# Patient Record
Sex: MALE | Race: WHITE | NOT HISPANIC OR LATINO | Employment: FULL TIME | URBAN - METROPOLITAN AREA
[De-identification: names, ages, dates, MRNs, and addresses within clinical notes are randomized per-mention and may not be internally consistent; named-entity substitution may affect disease eponyms.]

---

## 2018-04-22 ENCOUNTER — HOSPITAL ENCOUNTER (EMERGENCY)
Facility: MEDICAL CENTER | Age: 33
End: 2018-04-22

## 2018-04-22 VITALS
TEMPERATURE: 98 F | DIASTOLIC BLOOD PRESSURE: 74 MMHG | BODY MASS INDEX: 29.88 KG/M2 | OXYGEN SATURATION: 98 % | HEART RATE: 77 BPM | SYSTOLIC BLOOD PRESSURE: 132 MMHG | RESPIRATION RATE: 16 BRPM | WEIGHT: 240.3 LBS | HEIGHT: 75 IN

## 2018-04-22 LAB — EKG IMPRESSION: NORMAL

## 2018-04-22 PROCEDURE — 302449 STATCHG TRIAGE ONLY (STATISTIC)

## 2018-04-22 PROCEDURE — 93005 ELECTROCARDIOGRAM TRACING: CPT

## 2018-04-22 ASSESSMENT — PAIN SCALES - GENERAL: PAINLEVEL_OUTOF10: 2

## 2018-09-20 ENCOUNTER — OCCUPATIONAL MEDICINE (OUTPATIENT)
Dept: OCCUPATIONAL MEDICINE | Facility: CLINIC | Age: 33
End: 2018-09-20
Payer: COMMERCIAL

## 2018-09-20 ENCOUNTER — APPOINTMENT (OUTPATIENT)
Dept: RADIOLOGY | Facility: IMAGING CENTER | Age: 33
End: 2018-09-20
Attending: PREVENTIVE MEDICINE
Payer: COMMERCIAL

## 2018-09-20 VITALS
HEIGHT: 75 IN | WEIGHT: 235 LBS | HEART RATE: 90 BPM | BODY MASS INDEX: 29.22 KG/M2 | RESPIRATION RATE: 16 BRPM | SYSTOLIC BLOOD PRESSURE: 128 MMHG | DIASTOLIC BLOOD PRESSURE: 82 MMHG

## 2018-09-20 DIAGNOSIS — M54.16 LEFT LUMBAR RADICULOPATHY: ICD-10-CM

## 2018-09-20 PROCEDURE — 72100 X-RAY EXAM L-S SPINE 2/3 VWS: CPT | Mod: TC | Performed by: FAMILY MEDICINE

## 2018-09-20 PROCEDURE — 99204 OFFICE O/P NEW MOD 45 MIN: CPT | Performed by: PREVENTIVE MEDICINE

## 2018-09-20 RX ORDER — TIZANIDINE 4 MG/1
4 TABLET ORAL EVERY 6 HOURS PRN
Qty: 30 TAB | Refills: 3 | Status: SHIPPED | OUTPATIENT
Start: 2018-09-20

## 2018-09-20 RX ORDER — DICLOFENAC SODIUM 75 MG/1
75 TABLET, DELAYED RELEASE ORAL 2 TIMES DAILY
Qty: 60 TAB | Refills: 1 | Status: SHIPPED | OUTPATIENT
Start: 2018-09-20

## 2018-09-20 ASSESSMENT — PAIN SCALES - GENERAL: PAINLEVEL: 9=SEVERE PAIN

## 2018-09-20 NOTE — LETTER
"[address],   [address] [place] [address]  Phone:  [phone] - Fax:  [phone]   [facility] Progress Report and Disability Certification  Date of Service: 9/20/2018   No Show:  No  Date / Time of Next Visit: 10/18/2018 @ 10:00 AM    Claim Information   Patient Name: Josue Avitia  Claim Number:     Employer:   [employer] Date of Injury: 9/10/2018     Insurer / TPA: Misc Workers Comp  ID / SSN:     Occupation: Forklift worker  Diagnosis: The encounter diagnosis was Left lumbar radiculopathy.    Medical Information   Related to Industrial Injury? Yes    Subjective Complaints:  Date of injury 9/10/2018.  Mechanism of injury- \"grabbed object and stood up and felt sharp pain go down\" back and left leg.  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  He has been seen on urgent care for 1 visit where steroids were prescribed.  He has had very slight improvement.  He continues to complain of 9/10 unbearable, shooting, throbbing pain with radiation to the left calf.  He also feels pain on the lateral toes.  No urinary symptoms.   Objective Findings: Appearance: Well-developed, well-nourished.   Mental Status: Mood and Affect normal. Pleasant. Cooperative. Appropriate.   ENT: Oropharynx clear. Moist mucous membranes. Hearing normal.   Eyes: Pupils reactive. Conjunctiva normal. No scleral icterus.   Neck: Trachea Midline. No thyromegaly. No masses.  Cardiovascular: Normal rate. Regular rhythm. Normal heart sounds.   Chest: Effort normal. Breath sounds clear.   Skin: Skin is warm and dry. No rash.   Musculoskeletal: Back exam shows mild thoracolumbar paraspinal tenderness.  No sciatic notch tenderness.  Positive straight leg raise.  Positive crossover sign.  Flexion-extension pain.  Distal neurovascular intact.   Pre-Existing Condition(s):     Assessment:   Condition Same    Status: Additional Care Required  Permanent " Disability:No    Plan: MedicationMedication (NOT at Work)PTDiagnostics    Diagnostics: MRI    Comments:  MRI requested for definitive diagnosis due to lumbar radiculopathy  Physical therapy requested for additional pain relief and therapeutic exercise    Disability Information   Status: Released to Restricted Duty    From:  9/20/2018  Through: 10/18/2018 Restrictions are: Temporary   Physical Restrictions   Sitting:    Standing:    Stooping:  < or = to 1 hr/day Bending:  < or = to 1 hr/day   Squatting:    Walking:    Climbing:    Pushing:      Pulling:    Other:    Reaching Above Shoulder (L):   Reaching Above Shoulder (R):       Reaching Below Shoulder (L):    Reaching Below Shoulder (R):      Not to exceed Weight Limits   Carrying(hrs):   Weight Limit(lb):   Lifting(hrs):   Weight  Limit(lb): < or = to 10 pounds   Comments:      Repetitive Actions   Hands: i.e. Fine Manipulations from Grasping:     Feet: i.e. Operating Foot Controls:     Driving / Operate Machinery:     Physician Name: Neal Phipps M.D. Physician Signature: NEAL Obando M.D. e-Signature: Dr. Anurag Garcia, Medical Director   Clinic Name / Location: [address],   [address] [place] [address] Clinic Phone Number: Dept: [phone]   Appointment Time: 1:00 Pm Visit Start Time: 1:06 PM   Check-In Time:  12:59 Pm Visit Discharge Time:  2:10PM   Original-Treating Physician or Chiropractor    Page 2-Insurer/TPA    Page 3-Employer    Page 4-Employee

## 2018-09-20 NOTE — PROGRESS NOTES
"Subjective:      Josue Avitia is a 33 y.o. male who presents with Other (WC new2u DOI 9/10/18 lower back, same, rm 2)      Date of injury 9/10/2018.  Mechanism of injury- \"grabbed object and stood up and felt sharp pain go down\" back and left leg.  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  He has been seen on urgent care for 1 visit where steroids were prescribed.  He has had very slight improvement.  He continues to complain of 9/10 unbearable, shooting, throbbing pain with radiation to the left calf.  He also feels pain on the lateral toes.  No urinary symptoms.     HPI    ROS  Comprehensive medical history form reviewed. Pertinent positives and negatives included in HPI.    PFSH: reviewed in Epic    PMH:  has a past medical history of Hepatitis C.  MEDS:   Current Outpatient Prescriptions:   •  diclofenac EC (VOLTAREN) 75 MG Tablet Delayed Response, Take 1 Tab by mouth 2 times a day., Disp: 60 Tab, Rfl: 1  •  tizanidine (ZANAFLEX) 4 MG Tab, Take 1 Tab by mouth every 6 hours as needed., Disp: 30 Tab, Rfl: 3  •  MethylPREDNISolone (MEDROL DOSEPAK) 4 MG Tablet Therapy Pack, Use as directed, Disp: 21 Tab, Rfl: 0  ALLERGIES:   Allergies   Allergen Reactions   • Amoxicillin    • Keflex    • Penicillin G      SURGHX: History reviewed. No pertinent surgical history.  SOCHX:  reports that he quit smoking about 5 months ago. He has never used smokeless tobacco. He reports that he drinks alcohol. He reports that he does not use drugs.  Work Status: Works as   FH: No pertinent hereditary disorders.        Objective:     /82   Pulse 90   Resp 16   Ht 1.905 m (6' 3\")   Wt 106.6 kg (235 lb)   BMI 29.37 kg/m²      Physical Exam    Appearance: Well-developed, well-nourished.   Mental Status: Mood and Affect normal. Pleasant. Cooperative. Appropriate.   ENT: Oropharynx clear. Moist mucous membranes. Hearing normal.   Eyes: Pupils reactive. Conjunctiva normal. No " scleral icterus.   Neck: Trachea Midline. No thyromegaly. No masses.  Cardiovascular: Normal rate. Regular rhythm. Normal heart sounds.   Chest: Effort normal. Breath sounds clear.   Skin: Skin is warm and dry. No rash.   Musculoskeletal: Back exam shows mild thoracolumbar paraspinal tenderness.  No sciatic notch tenderness.  Positive straight leg raise.  Positive crossover sign.  Flexion-extension pain.  Distal neurovascular intact.       Assessment/Plan:     1. Left lumbar radiculopathy  New to occupational health from urgent care  - DX-LUMBAR SPINE-2 OR 3 VIEWS; Future  - diclofenac EC (VOLTAREN) 75 MG Tablet Delayed Response; Take 1 Tab by mouth 2 times a day.  Dispense: 60 Tab; Refill: 1  - tizanidine (ZANAFLEX) 4 MG Tab; Take 1 Tab by mouth every 6 hours as needed.  Dispense: 30 Tab; Refill: 3  - REFERRAL TO PHYSICAL THERAPY Reason for Therapy: Eval/Treat/Report- primarily for additional pain relief pending MRI  - MR-LUMBAR SPINE-W/O; Future-probable discogenic low back pain due to lumbar radiculopathy  - REFERRAL TO RADIOLOGY  Restricted activity  Recheck in 3 weeks or sooner if MRI accomplished  Anticipate physiatry referral for epidural steroids/nerve root block

## 2018-09-25 ENCOUNTER — NON-PROVIDER VISIT (OUTPATIENT)
Dept: OCCUPATIONAL MEDICINE | Facility: CLINIC | Age: 33
End: 2018-09-25

## 2018-09-25 PROCEDURE — 8911 PR MRO FEE: Performed by: INTERNAL MEDICINE

## 2018-10-18 ENCOUNTER — OCCUPATIONAL MEDICINE (OUTPATIENT)
Dept: OCCUPATIONAL MEDICINE | Facility: CLINIC | Age: 33
End: 2018-10-18
Payer: COMMERCIAL

## 2018-10-18 VITALS
SYSTOLIC BLOOD PRESSURE: 124 MMHG | WEIGHT: 238 LBS | DIASTOLIC BLOOD PRESSURE: 74 MMHG | HEIGHT: 75 IN | OXYGEN SATURATION: 95 % | TEMPERATURE: 98.4 F | HEART RATE: 84 BPM | BODY MASS INDEX: 29.59 KG/M2

## 2018-10-18 DIAGNOSIS — M54.16 LEFT LUMBAR RADICULOPATHY: ICD-10-CM

## 2018-10-18 PROCEDURE — 99213 OFFICE O/P EST LOW 20 MIN: CPT | Performed by: PREVENTIVE MEDICINE

## 2018-10-18 ASSESSMENT — ENCOUNTER SYMPTOMS
CONSTITUTIONAL NEGATIVE: 1
GASTROINTESTINAL NEGATIVE: 1

## 2018-10-18 NOTE — PROGRESS NOTES
"Subjective:      Josue Avitia is a 33 y.o. male who presents with Back Pain (WC DOI 9/10/18 Lower back pain-same RM21)      Date of injury 9/10/2018.  Mechanism of injury- \"grabbed object and stood up and felt sharp pain go down\" back and left leg.  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  He does not really indicate any improvement.  He continues to have left-sided lower back pain with radiation to the left calf.  He is attending physical therapy.  MRI was accomplished.  He is not working.     HPI    Review of Systems   Constitutional: Negative.    Gastrointestinal: Negative.      PFSH:  WORK STATUS: Not working-no light duty available  PMH:  has a past medical history of Hepatitis C.  MEDS:   Current Outpatient Prescriptions:   •  diclofenac EC (VOLTAREN) 75 MG Tablet Delayed Response, Take 1 Tab by mouth 2 times a day., Disp: 60 Tab, Rfl: 1  •  tizanidine (ZANAFLEX) 4 MG Tab, Take 1 Tab by mouth every 6 hours as needed., Disp: 30 Tab, Rfl: 3  •  MethylPREDNISolone (MEDROL DOSEPAK) 4 MG Tablet Therapy Pack, Use as directed (Patient not taking: Reported on 10/18/2018), Disp: 21 Tab, Rfl: 0       Objective:     /74   Pulse 84   Temp 36.9 °C (98.4 °F)   Ht 1.905 m (6' 3\")   Wt 108 kg (238 lb)   SpO2 95%   BMI 29.75 kg/m²      Physical Exam    Appearance: Well-developed, well-nourished.   Mental Status: Mood and Affect normal. Pleasant. Cooperative. Appropriate.   Musculoskeletal: Normal posture.  Normal gait.  Painful flexion and extension.  MRI final result pending.       Assessment/Plan:     1. Left lumbar radiculopathy  Condition unchanged  May continue physical therapy  - REFERRAL TO PHYSIATRY (PMR) for epidural steroid/nerve root block  Restricted activity  Recheck in 1 month      "

## 2018-10-18 NOTE — LETTER
"[address],   [address] [place] [address]  Phone:  [phone] - Fax:  [phone]   [facility] Progress Report and Disability Certification  Date of Service: 10/18/2018   No Show:  No  Date / Time of Next Visit: 11/29/2018 @ 8:15 AM   Claim Information   Patient Name: Josue Avitia  Claim Number:     Employer:   [employer] Date of Injury: 9/10/2018     Insurer / TPA: Misc Workers Comp  ID / SSN:     Occupation: Forklift worker  Diagnosis: The encounter diagnosis was Left lumbar radiculopathy.    Medical Information   Related to Industrial Injury? Yes    Subjective Complaints:  Date of injury 9/10/2018.  Mechanism of injury- \"grabbed object and stood up and felt sharp pain go down\" back and left leg.  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  He does not really indicate any improvement.  He continues to have left-sided lower back pain with radiation to the left calf.  He is attending physical therapy.  MRI was accomplished.  He is not working.   Objective Findings: Appearance: Well-developed, well-nourished.   Mental Status: Mood and Affect normal. Pleasant. Cooperative. Appropriate.   Musculoskeletal: Normal posture.  Normal gait.  Painful flexion and extension.  MRI final result pending.   Pre-Existing Condition(s):     Assessment:   Condition Same    Status: Additional Care Required  Permanent Disability:     Plan: Consultation    Diagnostics:      Comments:  Physiatry consultation for epidural steroid/nerve root block    Disability Information   Status: Released to Restricted Duty    From:  10/18/2018  Through: 11/29/2018 Restrictions are:     Physical Restrictions   Sitting:    Standing:    Stooping:    Bending:  < or = to 1 hr/day   Squatting:    Walking:    Climbing:    Pushing:      Pulling:    Other:    Reaching Above Shoulder (L):   Reaching Above Shoulder (R):       Reaching Below Shoulder (L):  " Reaching Below Shoulder (R):      Not to exceed Weight Limits   Carrying(hrs):   Weight Limit(lb):   Lifting(hrs):   Weight  Limit(lb): < or = to 10 pounds   Comments:      Repetitive Actions   Hands: i.e. Fine Manipulations from Grasping:     Feet: i.e. Operating Foot Controls:     Driving / Operate Machinery:     Physician Name: Neal Phipps M.D. Physician Signature: NEAL Obando M.D. e-Signature: Dr. Anurag Garcia, Medical Director   Clinic Name / Location: [address],   [address]  [place] [address] Clinic Phone Number: Dept: [phone]   Appointment Time: 10:15 Am Visit Start Time: 10:19 AM   Check-In Time:  9:55 Am Visit Discharge Time: 11:44 AM   Original-Treating Physician or Chiropractor    Page 2-Insurer/TPA    Page 3-Employer    Page 4-Employee

## 2018-10-29 ENCOUNTER — HOSPITAL ENCOUNTER (EMERGENCY)
Facility: MEDICAL CENTER | Age: 33
End: 2018-10-29
Attending: EMERGENCY MEDICINE

## 2018-10-29 ENCOUNTER — APPOINTMENT (OUTPATIENT)
Dept: RADIOLOGY | Facility: MEDICAL CENTER | Age: 33
End: 2018-10-29
Attending: EMERGENCY MEDICINE

## 2018-10-29 VITALS
OXYGEN SATURATION: 97 % | WEIGHT: 236.77 LBS | BODY MASS INDEX: 29.44 KG/M2 | DIASTOLIC BLOOD PRESSURE: 61 MMHG | SYSTOLIC BLOOD PRESSURE: 121 MMHG | HEIGHT: 75 IN | RESPIRATION RATE: 16 BRPM | HEART RATE: 68 BPM | TEMPERATURE: 98.9 F

## 2018-10-29 DIAGNOSIS — R19.7 DIARRHEA, UNSPECIFIED TYPE: ICD-10-CM

## 2018-10-29 DIAGNOSIS — R07.89 OTHER CHEST PAIN: ICD-10-CM

## 2018-10-29 DIAGNOSIS — F41.9 ANXIETY: ICD-10-CM

## 2018-10-29 DIAGNOSIS — R07.9 CHEST PAIN, UNSPECIFIED TYPE: ICD-10-CM

## 2018-10-29 LAB
ALBUMIN SERPL BCP-MCNC: 4.7 G/DL (ref 3.2–4.9)
ALBUMIN/GLOB SERPL: 1.4 G/DL
ALP SERPL-CCNC: 60 U/L (ref 30–99)
ALT SERPL-CCNC: 49 U/L (ref 2–50)
ANION GAP SERPL CALC-SCNC: 7 MMOL/L (ref 0–11.9)
AST SERPL-CCNC: 23 U/L (ref 12–45)
BASOPHILS # BLD AUTO: 0.9 % (ref 0–1.8)
BASOPHILS # BLD: 0.07 K/UL (ref 0–0.12)
BILIRUB SERPL-MCNC: 0.6 MG/DL (ref 0.1–1.5)
BUN SERPL-MCNC: 10 MG/DL (ref 8–22)
CALCIUM SERPL-MCNC: 9.8 MG/DL (ref 8.5–10.5)
CHLORIDE SERPL-SCNC: 105 MMOL/L (ref 96–112)
CO2 SERPL-SCNC: 26 MMOL/L (ref 20–33)
CREAT SERPL-MCNC: 0.83 MG/DL (ref 0.5–1.4)
EKG IMPRESSION: NORMAL
EOSINOPHIL # BLD AUTO: 0.42 K/UL (ref 0–0.51)
EOSINOPHIL NFR BLD: 5.7 % (ref 0–6.9)
ERYTHROCYTE [DISTWIDTH] IN BLOOD BY AUTOMATED COUNT: 36.4 FL (ref 35.9–50)
GLOBULIN SER CALC-MCNC: 3.4 G/DL (ref 1.9–3.5)
GLUCOSE SERPL-MCNC: 105 MG/DL (ref 65–99)
HCT VFR BLD AUTO: 47.2 % (ref 42–52)
HGB BLD-MCNC: 17.6 G/DL (ref 14–18)
IMM GRANULOCYTES # BLD AUTO: 0.04 K/UL (ref 0–0.11)
IMM GRANULOCYTES NFR BLD AUTO: 0.5 % (ref 0–0.9)
LYMPHOCYTES # BLD AUTO: 1.5 K/UL (ref 1–4.8)
LYMPHOCYTES NFR BLD: 20.2 % (ref 22–41)
MCH RBC QN AUTO: 32.5 PG (ref 27–33)
MCHC RBC AUTO-ENTMCNC: 37.3 G/DL (ref 33.7–35.3)
MCV RBC AUTO: 87.2 FL (ref 81.4–97.8)
MONOCYTES # BLD AUTO: 0.61 K/UL (ref 0–0.85)
MONOCYTES NFR BLD AUTO: 8.2 % (ref 0–13.4)
NEUTROPHILS # BLD AUTO: 4.77 K/UL (ref 1.82–7.42)
NEUTROPHILS NFR BLD: 64.5 % (ref 44–72)
NRBC # BLD AUTO: 0 K/UL
NRBC BLD-RTO: 0 /100 WBC
PLATELET # BLD AUTO: 341 K/UL (ref 164–446)
PMV BLD AUTO: 9.5 FL (ref 9–12.9)
POTASSIUM SERPL-SCNC: 3.8 MMOL/L (ref 3.6–5.5)
PROT SERPL-MCNC: 8.1 G/DL (ref 6–8.2)
RBC # BLD AUTO: 5.41 M/UL (ref 4.7–6.1)
SODIUM SERPL-SCNC: 138 MMOL/L (ref 135–145)
TROPONIN I SERPL-MCNC: <0.01 NG/ML (ref 0–0.04)
WBC # BLD AUTO: 7.4 K/UL (ref 4.8–10.8)

## 2018-10-29 PROCEDURE — 85025 COMPLETE CBC W/AUTO DIFF WBC: CPT

## 2018-10-29 PROCEDURE — 84484 ASSAY OF TROPONIN QUANT: CPT

## 2018-10-29 PROCEDURE — 90791 PSYCH DIAGNOSTIC EVALUATION: CPT

## 2018-10-29 PROCEDURE — 93005 ELECTROCARDIOGRAM TRACING: CPT | Performed by: EMERGENCY MEDICINE

## 2018-10-29 PROCEDURE — 99285 EMERGENCY DEPT VISIT HI MDM: CPT

## 2018-10-29 PROCEDURE — 80053 COMPREHEN METABOLIC PANEL: CPT

## 2018-10-29 PROCEDURE — 93005 ELECTROCARDIOGRAM TRACING: CPT

## 2018-10-29 PROCEDURE — 71045 X-RAY EXAM CHEST 1 VIEW: CPT

## 2018-10-29 RX ORDER — ASPIRIN 325 MG
325 TABLET ORAL EVERY 6 HOURS PRN
COMMUNITY

## 2018-10-29 ASSESSMENT — LIFESTYLE VARIABLES
CONSUMPTION TOTAL: NEGATIVE
TOTAL SCORE: 0
EVER FELT BAD OR GUILTY ABOUT YOUR DRINKING: NO
HAVE PEOPLE ANNOYED YOU BY CRITICIZING YOUR DRINKING: NO
TOTAL SCORE: 0
HAVE YOU EVER FELT YOU SHOULD CUT DOWN ON YOUR DRINKING: NO
AVERAGE NUMBER OF DAYS PER WEEK YOU HAVE A DRINK CONTAINING ALCOHOL: 1
EVER HAD A DRINK FIRST THING IN THE MORNING TO STEADY YOUR NERVES TO GET RID OF A HANGOVER: NO
HOW MANY TIMES IN THE PAST YEAR HAVE YOU HAD 5 OR MORE DRINKS IN A DAY: 0
ON A TYPICAL DAY WHEN YOU DRINK ALCOHOL HOW MANY DRINKS DO YOU HAVE: 1
DO YOU DRINK ALCOHOL: YES
TOTAL SCORE: 0

## 2018-10-29 ASSESSMENT — PAIN DESCRIPTION - DESCRIPTORS: DESCRIPTORS: TINGLING

## 2018-10-29 ASSESSMENT — PAIN SCALES - GENERAL
PAINLEVEL_OUTOF10: 0
PAINLEVEL_OUTOF10: 0

## 2018-10-29 NOTE — ED TRIAGE NOTES
"Chief Complaint   Patient presents with   • Diarrhea     Pt states sx for about 3 weeks several episodes a day.  +weakness and fatigue.    • Chest Pain     Pt reporting \"twinges\" in his chest and some associated nausea and tingling in his mouth.     Pt recently injured his back on workers comp and has been taking Aspirin for.   /92   Pulse 91   Temp 36 °C (96.8 °F)   Resp 16   Ht 1.905 m (6' 3\")   Wt 107.4 kg (236 lb 12.4 oz)   SpO2 96%   BMI 29.59 kg/m²   EKG called.   "

## 2018-10-30 NOTE — ED PROVIDER NOTES
"ED Provider Note    Scribed for Kortney Amos M.D. by Tyree Peña. 10/29/2018, 5:27 PM.    Primary care provider: Pcp Pt States None  Means of arrival: Walk in  History obtained from: Patient  History limited by: None    CHIEF COMPLAINT  Chief Complaint   Patient presents with   • Diarrhea     Pt states sx for about 3 weeks several episodes a day.  +weakness and fatigue.    • Chest Pain     Pt reporting \"twinges\" in his chest and some associated nausea and tingling in his mouth.     HPI  Josue Avitia is a 33 y.o. male who presents to the Emergency Department complaining of chest pain and diarrhea.  He reports the diarrhea has been present for three weeks and occurs several times throughout the day.  The patient denies any hematochezia, recent travel, drinking from a well, family disease of intestinal issues, antibiotic use, exposure to others with diarrhea, fevers or vomiting.  There is associated abdominal pain, weakness, and fatigue with the diarrhea.    The chest pain is described as pressure with associated nausea and tingling in the mouth onset 4 days ago.  He states it feels like his heart is fluttering. The patient states he has had an anxiety attack previously and had his heart evaluated in [place].  Patient's paternal grandfather passed away from a myocardial infarction at approximately 60 pounds.  The pain is exacerbated with exertion and he has shortness of breath with going up stairs.  Patient additionally reports he smokes on average 1/2 pack per day of and on for the past few years.    The patient states he does not have health insurance at the moment and has been meaning to work up his health with a primary physician.  He does report an L5-S1 work comp injury that has kept him out of work.  The patient has felt stressed out, depressed, and tired with this process.  The patient is trying to address this with his [facility] care but states it is difficult to follow up due to their " "resources.    REVIEW OF SYSTEMS  Pertinent positives include chest pain, nausea, diarrhea, weakness, fatigue. Pertinent negatives include no vomiting, fevers, or hematochezia. All other systems reviewed and negative.     PAST MEDICAL HISTORY   has a past medical history of Hepatitis C.    SURGICAL HISTORY  patient denies any surgical history    SOCIAL HISTORY  Social History   Substance Use Topics   • Smoking status: Current Every Day Smoker     Last attempt to quit: 3/22/2018   • Smokeless tobacco: Never Used   • Alcohol use Yes      Comment: WEEKENDS      History   Drug Use   • Types: Inhaled     Comment: marijuana     FAMILY HISTORY  History reviewed. No pertinent family history.    CURRENT MEDICATIONS  No current facility-administered medications for this encounter.     Current Outpatient Prescriptions:   •  aspirin (ASA) 325 MG Tab, Take 325 mg by mouth every 6 hours as needed., Disp: , Rfl:   •  diclofenac EC (VOLTAREN) 75 MG Tablet Delayed Response, Take 1 Tab by mouth 2 times a day., Disp: 60 Tab, Rfl: 1  •  tizanidine (ZANAFLEX) 4 MG Tab, Take 1 Tab by mouth every 6 hours as needed., Disp: 30 Tab, Rfl: 3  •  MethylPREDNISolone (MEDROL DOSEPAK) 4 MG Tablet Therapy Pack, Use as directed (Patient not taking: Reported on 10/18/2018), Disp: 21 Tab, Rfl: 0     ALLERGIES  Allergies   Allergen Reactions   • Amoxicillin    • Keflex    • Penicillin G        PHYSICAL EXAM  VITAL SIGNS: /92   Pulse 65   Temp 36 °C (96.8 °F)   Resp 16   Ht 1.905 m (6' 3\")   Wt 107.4 kg (236 lb 12.4 oz)   SpO2 95%   BMI 29.59 kg/m²     Constitutional:  Laying in bed, sitting up next to his wife, able to answer questions  HENT: Nose is normal in appearance without rhinorrhea, external ears are normal,  moist mucous membranes  Eyes: Anicteric, pupils are equal round and reactive, there is no conjunctival drainage or pallor   Neck: The trachea is midline, there is no obvious mass or meningeal signs  Cardiovascular: Equal radial " pulsation, regular rate and rhythm without murmurs gallops or rubs  Thorax & Lungs: Respiratory rate and effort are normal. There is normal chest excursion with respiration.  No wheezes rhonchi or rales noted.  Abdomen: Abdomen is normal in appearance  :  No CVA tenderness to palpation  Musculoskeletal: No deformities noted in all 4 extremities. Actively moves all 4 extremities  Skin: Visualized skin is warm, no erythema, no rash.  Neurologic:  Cranial nerves II through XII are intact there is no focal abnormality noted.  Psychiatric: Normal mood and mentation        DIAGNOSTIC STUDIES / PROCEDURES    LABS  Results for orders placed or performed during the hospital encounter of 10/29/18   CBC WITH DIFFERENTIAL   Result Value Ref Range    WBC 7.4 4.8 - 10.8 K/uL    RBC 5.41 4.70 - 6.10 M/uL    Hemoglobin 17.6 14.0 - 18.0 g/dL    Hematocrit 47.2 42.0 - 52.0 %    MCV 87.2 81.4 - 97.8 fL    MCH 32.5 27.0 - 33.0 pg    MCHC 37.3 (H) 33.7 - 35.3 g/dL    RDW 36.4 35.9 - 50.0 fL    Platelet Count 341 164 - 446 K/uL    MPV 9.5 9.0 - 12.9 fL    Neutrophils-Polys 64.50 44.00 - 72.00 %    Lymphocytes 20.20 (L) 22.00 - 41.00 %    Monocytes 8.20 0.00 - 13.40 %    Eosinophils 5.70 0.00 - 6.90 %    Basophils 0.90 0.00 - 1.80 %    Immature Granulocytes 0.50 0.00 - 0.90 %    Nucleated RBC 0.00 /100 WBC    Neutrophils (Absolute) 4.77 1.82 - 7.42 K/uL    Lymphs (Absolute) 1.50 1.00 - 4.80 K/uL    Monos (Absolute) 0.61 0.00 - 0.85 K/uL    Eos (Absolute) 0.42 0.00 - 0.51 K/uL    Baso (Absolute) 0.07 0.00 - 0.12 K/uL    Immature Granulocytes (abs) 0.04 0.00 - 0.11 K/uL    NRBC (Absolute) 0.00 K/uL   COMP METABOLIC PANEL   Result Value Ref Range    Sodium 138 135 - 145 mmol/L    Potassium 3.8 3.6 - 5.5 mmol/L    Chloride 105 96 - 112 mmol/L    Co2 26 20 - 33 mmol/L    Anion Gap 7.0 0.0 - 11.9    Glucose 105 (H) 65 - 99 mg/dL    Bun 10 8 - 22 mg/dL    Creatinine 0.83 0.50 - 1.40 mg/dL    Calcium 9.8 8.5 - 10.5 mg/dL    AST(SGOT) 23 12 - 45  U/L    ALT(SGPT) 49 2 - 50 U/L    Alkaline Phosphatase 60 30 - 99 U/L    Total Bilirubin 0.6 0.1 - 1.5 mg/dL    Albumin 4.7 3.2 - 4.9 g/dL    Total Protein 8.1 6.0 - 8.2 g/dL    Globulin 3.4 1.9 - 3.5 g/dL    A-G Ratio 1.4 g/dL   ESTIMATED GFR   Result Value Ref Range    GFR If African American >60 >60 mL/min/1.73 m 2    GFR If Non African American >60 >60 mL/min/1.73 m 2   TROPONIN   Result Value Ref Range    Troponin I <0.01 0.00 - 0.04 ng/mL   EKG (NOW)   Result Value Ref Range    Report       [facility] Emergency Dept.    Test Date:  2018-10-29  Pt Name:    FERNY PRYOR             Department: ER  MRN:        [id number]                      Room:  Gender:     Male                         Technician: [id number]  :        1985                   Requested By:ER TRIAGE PROTOCOL  Order #:    [id number]                    Reading MD:    Measurements  Intervals                                Axis  Rate:       80                           P:          69  WA:         156                          QRS:        63  QRSD:       80                           T:          23  QT:         352  QTc:        406    Interpretive Statements  SINUS RHYTHM  PROBABLE LEFT ATRIAL ABNORMALITY  Compared to ECG 2018 20:56:35  No significant changes        All labs reviewed by me.    EKG  I interpreted this EKG myself.  This is a 12-lead study.  The rhythm is sinus with a rate of 80.  There are no ST segment nor T wave abnormalities.  Interpretation: No ST segment elevation myocardial infarction.  No changes from EKG completed in April.    RADIOLOGY  DX-CHEST-PORTABLE (1 VIEW)   Final Result         Ill-defined opacity in the left lower lobe, concerning for pneumonia.        The radiologist's interpretation of all radiological studies have been reviewed by me.    COURSE & MEDICAL DECISION MAKING  Nursing notes and vital signs were reviewed. (See chart for details)  The patient's  records were reviewed, history was  obtained from the patient and his wife;     The patient presents with diarrhea and chest pain, and the differential diagnosis includes but is not limited to acute coronary syndrome, anxiety, he is negative for PE by PERC criteria, his diarrhea does not have any red flag symptoms may be irritable bowel    5:27 PM Initial orders in the Emergency Department included Chest Xray, Troponin, estimated GFR, CBC, CMP, and EKG.  We have discussed the options of staying in the hospital overnight with cardiac monitoring and proceeding with a stress test tomorrow versus completing an outpatient stress test.  The patient desires to complete this on an outpatient basis and to undergo a life skills evaluation.  He has a negative troponin after 4 days of pain and EKG is unchanged I think this is unlikely to be acute coronary.  The wife is concerned about anxiety as he has lost days secondary to low back pain which is increasing the stress in their lives    5:52 PM discussed case with life skills who will evaluate the patient.    7:00 PM:  Life skills contacted me and revealed the patient has a history of methamphetamine use.  He was given the proper resources to support and feels safe to be discharged at this time.  He has been given outpatient cardiology follow-up and a follow-up for mental health as well as [facility] clinic for ongoing medical management      The patient will return for new or worsening symptoms and is stable at the time of discharge.    The patient is referred to a primary physician for blood pressure management, diabetic screening, and for all other preventative health concerns.    DISPOSITION:  Patient will be discharged home in stable condition.    FOLLOW UP:  [address] [address]  [phone]    call at 8 am    Mitchell Cotton M.D.  [address] [id number] [phone]      call to make an outpatient appointment, take a daily aspirin and stop  smoking      OUTPATIENT MEDICATIONS:  New Prescriptions    No medications on file         FINAL IMPRESSION  1. Chest pain, unspecified type    2. Other chest pain    3. Anxiety    4. Diarrhea, unspecified type          I, Tyree Peña (Scribe), am scribing for, and in the presence of, Kortney Amos M.D..    Electronically signed by: Tyree Peña (Scribe), 10/29/2018    I, Kortney Amos M.D. personally performed the services described in this documentation, as scribed by Tyree Peña in my presence, and it is both accurate and complete. C.    The note accurately reflects work and decisions made by me.  Kortney Amos  10/29/2018  8:20 PM

## 2018-10-30 NOTE — CONSULTS
"[facility]   TRIAGE ASSESSMENT    Name: Josue Avitia  MRN: [id number]  : 1985  Age: 33 y.o.  Date of assessment: 10/29/2018  PCP: Pcp Pt States None  Persons in attendance: Patient and Spouse/Partner    CHIEF COMPLAINT/PRESENTING ISSUE (as stated by pt, rn, erp): This pt presents in the er with anxiety and chest \"tightness\" and it appears some degree of depression related to a back injury sustained in aug of 2018. He adamantly denies any si or plan to harm himself or others. He does have a hx of mdd when he was age 19 with a suicide attempt. He denies any hx since and denies any psychosis or hx of psychosis.   Chief Complaint   Patient presents with   • Diarrhea     Pt states sx for about 3 weeks several episodes a day.  +weakness and fatigue.    • Chest Pain     Pt reporting \"twinges\" in his chest and some associated nausea and tingling in his mouth.        CURRENT LIVING SITUATION/SOCIAL SUPPORT: pt lives with his wife. They both have adult children from previous marriages. His wife is in the er with him and appears very supportive. His dad is  and his mother and a brother live in [place]. He stays in touch with them and his children back there, as well.  This pt appears to have a solid social support system.      BEHAVIORAL HEALTH TREATMENT HISTORY  Does patient/parent report a history of prior behavioral health treatment for patient?   Yes:    Dates Level of Care Facilty/Provider Diagnosis/Problem Medications   [facility] and Strattera                                                                        SAFETY ASSESSMENT - SELF  Does patient acknowledge current or past symptoms of dangerousness to self? yes  Does parent/significant other report patient has current or past symptoms of dangerousness to self? yes  Does presenting problem suggest symptoms of dangerousness to self? No pt adamantly denies any si or plan to harm himself. He " "denies any access to a firearm. He appears to have future orientation. He agrees to seek help if any thoughts of self harm develop. His wife is comfortable taking him home.    SAFETY ASSESSMENT - OTHERS  Does patient acknowledge current or past symptoms of aggressive behavior or risk to others? no  Does parent/significant other report patient has current or past symptoms of aggressive behavior or risk to others?  no  Does presenting problem suggest symptoms of dangerousness to others? No pt denies any hi    Crisis Safety Plan completed and copy given to patient? yes    ABUSE/NEGLECT SCREENING  Does patient report feeling “unsafe” in his/her home, or afraid of anyone?  no  Does patient report any history of physical, sexual, or emotional abuse?  no  Does parent or significant other report any of the above? N\A  Is there evidence of neglect by self?  no  Is there evidence of neglect by a caregiver? no  Does the patient/parent report any history of CPS/APS/police involvement related to suspected abuse/neglect or domestic violence? no  Based on the information provided during the current assessment, is a mandated report of suspected abuse/neglect being made?  No    SUBSTANCE USE SCREENING  Yes:  Woo all substances used in the past 30 days: pt has had problems with meth and alcohol abuse in the past with one year clean and sober      Last Use Amount   []   Alcohol     []   Marijuana     []   Heroin     []   Prescription Opioids  (used without prescription, for    recreation, or in excess of prescribed amount)     []   Other Prescription  (used without prescription, for    recreation, or in excess of prescribed amount)     []   Cocaine      []   Methamphetamine     []   \"\" drugs (ectasy, MDMA)     []   Other substances        UDS results:pending  Breathalyzer results: 0.00    What consequences does the patient associate with any of the above substance use and or addictive behaviors? Relationship problems: "     Risk factors for detox (check all that apply):  []  Seizures   []  Diaphoretic (sweating)   []  Tremors   []  Hallucinations   []  Increased blood pressure   []  Decreased blood pressure   []  Other   [x]  None      [] Patient education on risk factors for detoxification and instructed to return to ER as needed.na      MENTAL STATUS   Participation: Active verbal participation, Attentive, Engaged, Open to feedback and Guarded  Grooming: Casual and Neat  Orientation: Alert and Fully Oriented  Behavior: Calm and Tense  Eye contact: Good  Mood: Depressed and Anxious  Affect: Constricted, Congruent with content, Sad and Anxious  Thought process: Logical and Goal-directed  Thought content: Within normal limits  Speech: Rate within normal limits and Volume within normal limits  Perception: Within normal limits  Memory:  No gross evidence of memory deficits  Insight: Good  Judgment:  Adequate  Other:    Collateral information:   Source:  [x] Significant other present in person:   [] Significant other by telephone  [] [facility]   [x] [facility]  [x] [facility]  [x] Other:     [] Unable to complete full assessment due to:  [] Acute intoxication  [] Patient declined to participate/engage  [] Patient verbally unresponsive  [] Significant cognitive deficits  [] Significant perceptual distortions or behavioral disorganization  [x] Other:      CLINICAL IMPRESSIONS:  Primary:reactive depression and anxiety   Secondary:hx of etoh and substance abuse (one yr of soberity)     IDENTIFIED NEEDS/PLAN:  [Trigger DISPOSITION list for any items marked]    []  Imminent safety risk - self [] Imminent safety risk - others   []  Acute substance withdrawal []  Psychosis/Impaired reality testing   [x]  Mood/anxiety []  Substance use/Addictive behavior   [x]  Maladaptive behaviro []  Parent/child conflict   []  Family/Couples conflict [x]  Biomedical   []  Housing [x]  Financial   []   Legal   Occupational/Educational   []  Domestic violence []  Other:     Disposition: Actively being addressed by 12 Step program: er substance abuse and op psychiatry packets, Primary Care Physician, [facility], [facility] and 12 step meetings    Does patient express agreement with the above plan? yes    Referral appointment(s) scheduled? no    Alert team only:   I have discussed findings and recommendations with Dr. Amos who is in agreement with these recommendations.33y male presents in the er with acute anxiety, chest pain and anxiety. He denies any si, hi or psychosis. He was discharged home with his wife with op referrals and a safety/crisis plan      Referral information sent to the following community providers :na    If applicable : Referred  to : lorenzo Chang R.N.  10/29/2018

## 2018-10-30 NOTE — DISCHARGE PLANNING
[facility]  Crisis/Safety Plan    Name:  Josue Avitia  MRN:  [id number]  Date:  10/29/2018    Warning signs that a crisis may be developing for me or I may be at risk:  1) feeling much more depressed and overwhelmed   2) having increasing anxiety making it hard to function  3) developing any thoughts of self harm    Coping strategies I can use on my own (relaxation, physical activity, etc):  1) try to exercise daily but check with your doctor first   2) watch tv or a movie  3) read for pleasure.    Ways I can make my environment safe:  1) no weapons in your living space  2) secure sharps  3) secure medications    Things I want to tell myself when I feel a crisis developin) try to stay calm  2) remember there is help out there.  3) get help before a crisis develops    People I can contact for support or distraction (and their phone numbers):  1) [facility] [phone]  2) use numbers below  3)    If I’m not able to reach my support people, or the above strategies don’t help, I can contact the following professionals, agencies, or hotlines:  1) Crisis Call Center ():  1-042-292-1907 -OR- (800) 552-6026  2) Crisis Text Line ():  Text START to 681092  3)   4)     Woo Chang R.N.

## 2018-10-30 NOTE — ED NOTES
Patient verbalized understanding of discharge instructions, and follow up plan. Denies questions, signed paper. Ambulated with wife out of the ed without difficulty

## 2018-11-28 ENCOUNTER — TELEPHONE (OUTPATIENT)
Dept: OCCUPATIONAL MEDICINE | Facility: CLINIC | Age: 33
End: 2018-11-28